# Patient Record
Sex: MALE | Race: WHITE | NOT HISPANIC OR LATINO
[De-identification: names, ages, dates, MRNs, and addresses within clinical notes are randomized per-mention and may not be internally consistent; named-entity substitution may affect disease eponyms.]

---

## 2018-06-21 ENCOUNTER — APPOINTMENT (OUTPATIENT)
Dept: GASTROENTEROLOGY | Facility: CLINIC | Age: 63
End: 2018-06-21

## 2021-04-07 DIAGNOSIS — M51.26 OTHER INTERVERTEBRAL DISC DISPLACEMENT, LUMBAR REGION: ICD-10-CM

## 2021-04-07 DIAGNOSIS — Z87.442 PERSONAL HISTORY OF URINARY CALCULI: ICD-10-CM

## 2021-04-07 DIAGNOSIS — Z86.39 PERSONAL HISTORY OF OTHER ENDOCRINE, NUTRITIONAL AND METABOLIC DISEASE: ICD-10-CM

## 2021-04-07 DIAGNOSIS — Z87.19 PERSONAL HISTORY OF OTHER DISEASES OF THE DIGESTIVE SYSTEM: ICD-10-CM

## 2021-04-07 DIAGNOSIS — Z82.49 FAMILY HISTORY OF ISCHEMIC HEART DISEASE AND OTHER DISEASES OF THE CIRCULATORY SYSTEM: ICD-10-CM

## 2021-04-07 DIAGNOSIS — Z80.7 FAMILY HISTORY OF OTHER MALIGNANT NEOPLASMS OF LYMPHOID, HEMATOPOIETIC AND RELATED TISSUES: ICD-10-CM

## 2021-04-07 DIAGNOSIS — N40.0 BENIGN PROSTATIC HYPERPLASIA WITHOUT LOWER URINARY TRACT SYMPMS: ICD-10-CM

## 2021-04-07 DIAGNOSIS — Z85.528 PERSONAL HISTORY OF OTHER MALIGNANT NEOPLASM OF KIDNEY: ICD-10-CM

## 2021-04-07 DIAGNOSIS — Z86.018 PERSONAL HISTORY OF OTHER BENIGN NEOPLASM: ICD-10-CM

## 2021-07-27 ENCOUNTER — APPOINTMENT (OUTPATIENT)
Dept: INTERNAL MEDICINE | Facility: CLINIC | Age: 66
End: 2021-07-27
Payer: COMMERCIAL

## 2021-07-27 VITALS — BODY MASS INDEX: 24.62 KG/M2 | WEIGHT: 172 LBS | HEIGHT: 70 IN

## 2021-07-27 VITALS — DIASTOLIC BLOOD PRESSURE: 74 MMHG | SYSTOLIC BLOOD PRESSURE: 136 MMHG

## 2021-07-27 PROCEDURE — 99396 PREV VISIT EST AGE 40-64: CPT | Mod: 25

## 2021-07-27 NOTE — REVIEW OF SYSTEMS
[Fever] : no fever [Chills] : no chills [Chest Pain] : no chest pain [Palpitations] : no palpitations [Lower Ext Edema] : no lower extremity edema [Shortness Of Breath] : no shortness of breath [Dyspnea on Exertion] : not dyspnea on exertion [Abdominal Pain] : no abdominal pain [Dysuria] : no dysuria [Hematuria] : no hematuria [Muscle Weakness] : no muscle weakness [Joint Swelling] : no joint swelling [Headache] : no headache [Dizziness] : no dizziness [Fainting] : no fainting [Negative] : Heme/Lymph

## 2021-07-27 NOTE — ASSESSMENT
[FreeTextEntry1] : His exam is unremarkable.\par \par Hypertension–his blood pressure is well controlled and he will remain on lisinopril 40 mg daily for now.\par \par Hyperlipidemia–follow-up fasting labs are sent his cholesterol has been elevated in the past but he has been reluctant to start medication but we will discuss it again when the results are available.\par \par History of renal cancer–he has been asymptomatic and he follows up regularly with his oncologist and surgeon.\par \par History of parathyroid adenoma/resection–he remains asymptomatic and follows with his endocrinologist.

## 2021-07-27 NOTE — HISTORY OF PRESENT ILLNESS
[de-identified] : 67 y/o presents for annual wellness and fasting labs.\par He has a history of hypertension, hyperlipidemia is well as renal cancer status post complete resection in 6/20.  He has regular follow-up with his surgeon as well as his oncologist and recently had a PET scan that was normal.  He has been generally well without any recent illness.

## 2021-07-27 NOTE — PHYSICAL EXAM
[No Acute Distress] : no acute distress [No JVD] : no jugular venous distention [No Lymphadenopathy] : no lymphadenopathy [Clear to Auscultation] : lungs were clear to auscultation bilaterally [Normal] : normal rate, regular rhythm, normal S1 and S2 and no murmur heard [No Carotid Bruits] : no carotid bruits [No Edema] : there was no peripheral edema [Non Tender] : non-tender [No Joint Swelling] : no joint swelling [No Focal Deficits] : no focal deficits [Normal Affect] : the affect was normal [Alert and Oriented x3] : oriented to person, place, and time

## 2021-07-27 NOTE — HEALTH RISK ASSESSMENT
[] : No [No] : In the past 12 months have you used drugs other than those required for medical reasons? No [No falls in past year] : Patient reported no falls in the past year [0] : 2) Feeling down, depressed, or hopeless: Not at all (0) [PHQ-2 Negative - No further assessment needed] : PHQ-2 Negative - No further assessment needed [[id number]] : 0 [Patient reported colonoscopy was normal] : Patient reported colonoscopy was normal [ColonoscopyDate] : 10/18

## 2021-07-28 LAB
ALBUMIN SERPL ELPH-MCNC: 4.7 G/DL
ALP BLD-CCNC: 54 U/L
ALT SERPL-CCNC: 24 U/L
ANION GAP SERPL CALC-SCNC: 13 MMOL/L
AST SERPL-CCNC: 23 U/L
BASOPHILS # BLD AUTO: 0.02 K/UL
BASOPHILS NFR BLD AUTO: 0.3 %
BILIRUB SERPL-MCNC: 0.3 MG/DL
BUN SERPL-MCNC: 35 MG/DL
CALCIUM SERPL-MCNC: 9.9 MG/DL
CHLORIDE SERPL-SCNC: 103 MMOL/L
CHOLEST SERPL-MCNC: 241 MG/DL
CO2 SERPL-SCNC: 24 MMOL/L
CREAT SERPL-MCNC: 1.43 MG/DL
EOSINOPHIL # BLD AUTO: 0.1 K/UL
EOSINOPHIL NFR BLD AUTO: 1.7 %
GLUCOSE SERPL-MCNC: 96 MG/DL
HCT VFR BLD CALC: 41.1 %
HDLC SERPL-MCNC: 60 MG/DL
HGB BLD-MCNC: 13.2 G/DL
IMM GRANULOCYTES NFR BLD AUTO: 0.3 %
LDLC SERPL CALC-MCNC: 166 MG/DL
LYMPHOCYTES # BLD AUTO: 1.39 K/UL
LYMPHOCYTES NFR BLD AUTO: 24.3 %
MAN DIFF?: NORMAL
MCHC RBC-ENTMCNC: 29.4 PG
MCHC RBC-ENTMCNC: 32.1 GM/DL
MCV RBC AUTO: 91.5 FL
MONOCYTES # BLD AUTO: 0.48 K/UL
MONOCYTES NFR BLD AUTO: 8.4 %
NEUTROPHILS # BLD AUTO: 3.71 K/UL
NEUTROPHILS NFR BLD AUTO: 65 %
NONHDLC SERPL-MCNC: 181 MG/DL
PLATELET # BLD AUTO: 313 K/UL
POTASSIUM SERPL-SCNC: 4.3 MMOL/L
PROT SERPL-MCNC: 6.7 G/DL
RBC # BLD: 4.49 M/UL
RBC # FLD: 13.3 %
SODIUM SERPL-SCNC: 140 MMOL/L
TRIGL SERPL-MCNC: 74 MG/DL
WBC # FLD AUTO: 5.72 K/UL

## 2022-08-08 ENCOUNTER — RX RENEWAL (OUTPATIENT)
Age: 67
End: 2022-08-08

## 2022-09-12 ENCOUNTER — APPOINTMENT (OUTPATIENT)
Dept: INTERNAL MEDICINE | Facility: CLINIC | Age: 67
End: 2022-09-12

## 2022-09-12 ENCOUNTER — NON-APPOINTMENT (OUTPATIENT)
Age: 67
End: 2022-09-12

## 2022-09-12 VITALS — WEIGHT: 156 LBS | HEIGHT: 70 IN | BODY MASS INDEX: 22.33 KG/M2

## 2022-09-12 VITALS — DIASTOLIC BLOOD PRESSURE: 82 MMHG | SYSTOLIC BLOOD PRESSURE: 134 MMHG

## 2022-09-12 DIAGNOSIS — R41.3 OTHER AMNESIA: ICD-10-CM

## 2022-09-12 PROCEDURE — G0402 INITIAL PREVENTIVE EXAM: CPT

## 2022-09-12 PROCEDURE — G0439: CPT

## 2022-09-12 PROCEDURE — 36415 COLL VENOUS BLD VENIPUNCTURE: CPT | Mod: 59

## 2022-09-12 NOTE — ASSESSMENT
[FreeTextEntry1] : His exam is unremarkable.\par Hypertension–his blood pressure remains well controlled on lisinopril 40 mg daily.\par \par Hyperlipidemia–follow-up lipid profile was sent.  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  His work-up was unremarkable and remains on aspirin 81 mg daily.\par \par History of renal cancer–he follows with his urologist/oncologist regularly.\par \par Last colonoscopy was in 10/18 and that was normal exam.

## 2022-09-12 NOTE — PHYSICAL EXAM
[No Acute Distress] : no acute distress [No JVD] : no jugular venous distention [No Lymphadenopathy] : no lymphadenopathy [Clear to Auscultation] : lungs were clear to auscultation bilaterally [Normal] : normal rate, regular rhythm, normal S1 and S2 and no murmur heard [No Carotid Bruits] : no carotid bruits [No Edema] : there was no peripheral edema [No Joint Swelling] : no joint swelling [Grossly Normal Strength/Tone] : grossly normal strength/tone [No Focal Deficits] : no focal deficits [Normal Affect] : the affect was normal [Alert and Oriented x3] : oriented to person, place, and time

## 2022-09-12 NOTE — HISTORY OF PRESENT ILLNESS
[de-identified] : 68 y/o male presents for annual Medicare wellness visit follow-up fasting labs and prescription renewals.\par He has history of hypertension, hyperlipidemia renal cancer status post complete resection in 2020 and hyperparathyroidism status post resection in 2018.\par Has been generally well without any recent illness.  He follows regularly with his urologist.\par Last colonoscopy was in 10/18 and that was a normal exam.

## 2022-09-12 NOTE — HEALTH RISK ASSESSMENT
[No falls in past year] : Patient reported no falls in the past year [0] : 2) Feeling down, depressed, or hopeless: Not at all (0) [PHQ-2 Negative - No further assessment needed] : PHQ-2 Negative - No further assessment needed [Never] : Never [No] : In the past 12 months have you used drugs other than those required for medical reasons? No [[id number]] : 0 [Patient reported colonoscopy was normal] : Patient reported colonoscopy was normal [ColonoscopyDate] : 10/18

## 2022-09-13 LAB
ALBUMIN SERPL ELPH-MCNC: 4.4 G/DL
ALP BLD-CCNC: 55 U/L
ALT SERPL-CCNC: 27 U/L
ANION GAP SERPL CALC-SCNC: 10 MMOL/L
AST SERPL-CCNC: 23 U/L
BASOPHILS # BLD AUTO: 0.02 K/UL
BASOPHILS NFR BLD AUTO: 0.4 %
BILIRUB SERPL-MCNC: 0.3 MG/DL
BUN SERPL-MCNC: 28 MG/DL
CALCIUM SERPL-MCNC: 9.6 MG/DL
CHLORIDE SERPL-SCNC: 101 MMOL/L
CHOLEST SERPL-MCNC: 212 MG/DL
CO2 SERPL-SCNC: 28 MMOL/L
CREAT SERPL-MCNC: 1.18 MG/DL
EGFR: 68 ML/MIN/1.73M2
EOSINOPHIL # BLD AUTO: 0.16 K/UL
EOSINOPHIL NFR BLD AUTO: 3.3 %
GLUCOSE SERPL-MCNC: 105 MG/DL
HCT VFR BLD CALC: 39.8 %
HDLC SERPL-MCNC: 57 MG/DL
HGB BLD-MCNC: 13 G/DL
IMM GRANULOCYTES NFR BLD AUTO: 0.2 %
LDLC SERPL CALC-MCNC: 139 MG/DL
LYMPHOCYTES # BLD AUTO: 1.36 K/UL
LYMPHOCYTES NFR BLD AUTO: 28.2 %
MAN DIFF?: NORMAL
MCHC RBC-ENTMCNC: 29.5 PG
MCHC RBC-ENTMCNC: 32.7 GM/DL
MCV RBC AUTO: 90.2 FL
MONOCYTES # BLD AUTO: 0.38 K/UL
MONOCYTES NFR BLD AUTO: 7.9 %
NEUTROPHILS # BLD AUTO: 2.89 K/UL
NEUTROPHILS NFR BLD AUTO: 60 %
NONHDLC SERPL-MCNC: 155 MG/DL
PLATELET # BLD AUTO: 292 K/UL
POTASSIUM SERPL-SCNC: 4.6 MMOL/L
PROT SERPL-MCNC: 6.4 G/DL
PSA SERPL-MCNC: 0.68 NG/ML
RBC # BLD: 4.41 M/UL
RBC # FLD: 13.1 %
SODIUM SERPL-SCNC: 140 MMOL/L
TRIGL SERPL-MCNC: 80 MG/DL
WBC # FLD AUTO: 4.82 K/UL

## 2023-09-26 ENCOUNTER — NON-APPOINTMENT (OUTPATIENT)
Age: 68
End: 2023-09-26

## 2023-09-26 ENCOUNTER — APPOINTMENT (OUTPATIENT)
Dept: INTERNAL MEDICINE | Facility: CLINIC | Age: 68
End: 2023-09-26
Payer: MEDICARE

## 2023-09-26 VITALS
WEIGHT: 164 LBS | DIASTOLIC BLOOD PRESSURE: 84 MMHG | SYSTOLIC BLOOD PRESSURE: 148 MMHG | HEIGHT: 70 IN | BODY MASS INDEX: 23.48 KG/M2

## 2023-09-26 DIAGNOSIS — Z00.00 ENCOUNTER FOR GENERAL ADULT MEDICAL EXAMINATION W/OUT ABNORMAL FINDINGS: ICD-10-CM

## 2023-09-26 DIAGNOSIS — D35.1 BENIGN NEOPLASM OF PARATHYROID GLAND: ICD-10-CM

## 2023-09-26 DIAGNOSIS — Z78.9 OTHER SPECIFIED HEALTH STATUS: ICD-10-CM

## 2023-09-26 DIAGNOSIS — E78.5 HYPERLIPIDEMIA, UNSPECIFIED: ICD-10-CM

## 2023-09-26 DIAGNOSIS — I10 ESSENTIAL (PRIMARY) HYPERTENSION: ICD-10-CM

## 2023-09-26 DIAGNOSIS — C64.2 MALIGNANT NEOPLASM OF LEFT KIDNEY, EXCEPT RENAL PELVIS: ICD-10-CM

## 2023-09-26 DIAGNOSIS — Z12.5 ENCOUNTER FOR SCREENING FOR MALIGNANT NEOPLASM OF PROSTATE: ICD-10-CM

## 2023-09-26 PROCEDURE — 36415 COLL VENOUS BLD VENIPUNCTURE: CPT

## 2023-09-26 PROCEDURE — G0439: CPT

## 2023-09-26 RX ORDER — LISINOPRIL 40 MG/1
40 TABLET ORAL
Qty: 90 | Refills: 3 | Status: ACTIVE | COMMUNITY
Start: 2021-04-07 | End: 1900-01-01

## 2023-09-27 LAB
ALBUMIN SERPL ELPH-MCNC: 4.5 G/DL
ALP BLD-CCNC: 54 U/L
ALT SERPL-CCNC: 26 U/L
ANION GAP SERPL CALC-SCNC: 13 MMOL/L
AST SERPL-CCNC: 22 U/L
BASOPHILS # BLD AUTO: 0.02 K/UL
BASOPHILS NFR BLD AUTO: 0.4 %
BILIRUB SERPL-MCNC: 0.2 MG/DL
BUN SERPL-MCNC: 24 MG/DL
CALCIUM SERPL-MCNC: 10 MG/DL
CHLORIDE SERPL-SCNC: 102 MMOL/L
CHOLEST SERPL-MCNC: 246 MG/DL
CO2 SERPL-SCNC: 26 MMOL/L
CREAT SERPL-MCNC: 1.12 MG/DL
EGFR: 72 ML/MIN/1.73M2
EOSINOPHIL # BLD AUTO: 0.16 K/UL
EOSINOPHIL NFR BLD AUTO: 3.2 %
GLUCOSE SERPL-MCNC: 98 MG/DL
HCT VFR BLD CALC: 40.4 %
HDLC SERPL-MCNC: 55 MG/DL
HGB BLD-MCNC: 13.1 G/DL
IMM GRANULOCYTES NFR BLD AUTO: 0.4 %
LDLC SERPL CALC-MCNC: 178 MG/DL
LYMPHOCYTES # BLD AUTO: 1.64 K/UL
LYMPHOCYTES NFR BLD AUTO: 32.5 %
MAN DIFF?: NORMAL
MCHC RBC-ENTMCNC: 29 PG
MCHC RBC-ENTMCNC: 32.4 GM/DL
MCV RBC AUTO: 89.6 FL
MONOCYTES # BLD AUTO: 0.51 K/UL
MONOCYTES NFR BLD AUTO: 10.1 %
NEUTROPHILS # BLD AUTO: 2.69 K/UL
NEUTROPHILS NFR BLD AUTO: 53.4 %
NONHDLC SERPL-MCNC: 191 MG/DL
PLATELET # BLD AUTO: 277 K/UL
POTASSIUM SERPL-SCNC: 4.5 MMOL/L
PROT SERPL-MCNC: 6.4 G/DL
PSA SERPL-MCNC: 0.79 NG/ML
RBC # BLD: 4.51 M/UL
RBC # FLD: 13.3 %
SODIUM SERPL-SCNC: 141 MMOL/L
T4 SERPL-MCNC: 7.3 UG/DL
TRIGL SERPL-MCNC: 75 MG/DL
TSH SERPL-ACNC: 3.01 UIU/ML
WBC # FLD AUTO: 5.04 K/UL

## 2024-09-19 ENCOUNTER — APPOINTMENT (OUTPATIENT)
Dept: INTERNAL MEDICINE | Facility: CLINIC | Age: 69
End: 2024-09-19
Payer: MEDICARE

## 2024-09-19 VITALS
WEIGHT: 154 LBS | SYSTOLIC BLOOD PRESSURE: 126 MMHG | DIASTOLIC BLOOD PRESSURE: 76 MMHG | BODY MASS INDEX: 29.07 KG/M2 | HEIGHT: 61 IN

## 2024-09-19 DIAGNOSIS — Z00.00 ENCOUNTER FOR GENERAL ADULT MEDICAL EXAMINATION W/OUT ABNORMAL FINDINGS: ICD-10-CM

## 2024-09-19 DIAGNOSIS — I10 ESSENTIAL (PRIMARY) HYPERTENSION: ICD-10-CM

## 2024-09-19 DIAGNOSIS — E78.5 HYPERLIPIDEMIA, UNSPECIFIED: ICD-10-CM

## 2024-09-19 DIAGNOSIS — Z12.5 ENCOUNTER FOR SCREENING FOR MALIGNANT NEOPLASM OF PROSTATE: ICD-10-CM

## 2024-09-19 PROCEDURE — 36415 COLL VENOUS BLD VENIPUNCTURE: CPT

## 2024-09-19 PROCEDURE — G0439: CPT

## 2024-09-19 NOTE — HISTORY OF PRESENT ILLNESS
[de-identified] : 70 y/o male presents for annual Medicare wellness visit follow-up fasting labs and prescription renewals. He has history of hypertension, hyperlipidemia renal cancer status post complete resection in 2020 and hyperparathyroidism status post resection in 2018. Recently had yearly follow-up with a urologist with no change. Has been generally well without any recent illness.   Last colonoscopy was in 10/18 and that was a normal exam.

## 2024-09-19 NOTE — REVIEW OF SYSTEMS
[Fever] : no fever [Chest Pain] : no chest pain [Palpitations] : no palpitations [Lower Ext Edema] : no lower extremity edema [Shortness Of Breath] : no shortness of breath [Dyspnea on Exertion] : not dyspnea on exertion [Abdominal Pain] : no abdominal pain [Muscle Weakness] : no muscle weakness [Headache] : no headache [Dizziness] : no dizziness [Fainting] : no fainting

## 2024-09-19 NOTE — ASSESSMENT
[FreeTextEntry1] : His exam is unremarkable.  Hypertension-his blood pressure remains well controlled. Continue lisinopril 40 mg daily.  [id number] lipid profile was sent.  He is presently not on medication.  Follow-up PSA was sent for prostate cancer screening.  He is asymptomatic.  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  His work-up was unremarkable and remains on aspirin 81 mg daily.  History of renal cancer-he follows with his urologist/oncologist regularly.  Last colonoscopy was in 10/18 and that was normal exam.

## 2024-09-19 NOTE — HEALTH RISK ASSESSMENT
[No] : In the past 12 months have you used drugs other than those required for medical reasons? No [No falls in past year] : Patient reported no falls in the past year [0] : 2) Feeling down, depressed, or hopeless: Not at all (0) [PHQ-2 Negative - No further assessment needed] : PHQ-2 Negative - No further assessment needed [Never] : Never [Patient reported colonoscopy was normal] : Patient reported colonoscopy was normal [[id number]] : 0 [ColonoscopyDate] : 10/18

## 2024-09-19 NOTE — PHYSICAL EXAM
[No Acute Distress] : no acute distress [No JVD] : no jugular venous distention [Clear to Auscultation] : lungs were clear to auscultation bilaterally [Regular Rhythm] : with a regular rhythm [Normal S1, S2] : normal S1 and S2 [No Murmur] : no murmur heard [No Edema] : there was no peripheral edema [Grossly Normal Strength/Tone] : grossly normal strength/tone [No Focal Deficits] : no focal deficits [Alert and Oriented x3] : oriented to person, place, and time [de-identified] : Benign

## 2024-09-20 ENCOUNTER — TRANSCRIPTION ENCOUNTER (OUTPATIENT)
Age: 69
End: 2024-09-20

## 2024-09-20 LAB
ALBUMIN SERPL ELPH-MCNC: 4.3 G/DL
ALP BLD-CCNC: 53 U/L
ALT SERPL-CCNC: 25 U/L
ANION GAP SERPL CALC-SCNC: 11 MMOL/L
AST SERPL-CCNC: 22 U/L
BASOPHILS # BLD AUTO: 0.02 K/UL
BASOPHILS NFR BLD AUTO: 0.4 %
BILIRUB SERPL-MCNC: 0.2 MG/DL
BUN SERPL-MCNC: 28 MG/DL
CALCIUM SERPL-MCNC: 9.3 MG/DL
CHLORIDE SERPL-SCNC: 105 MMOL/L
CHOLEST SERPL-MCNC: 243 MG/DL
CO2 SERPL-SCNC: 25 MMOL/L
CREAT SERPL-MCNC: 1.12 MG/DL
EGFR: 72 ML/MIN/1.73M2
EOSINOPHIL # BLD AUTO: 0.11 K/UL
EOSINOPHIL NFR BLD AUTO: 2.1 %
GLUCOSE SERPL-MCNC: 86 MG/DL
HCT VFR BLD CALC: 40.7 %
HDLC SERPL-MCNC: 55 MG/DL
HGB BLD-MCNC: 12.9 G/DL
IMM GRANULOCYTES NFR BLD AUTO: 0.7 %
LDLC SERPL CALC-MCNC: 169 MG/DL
LYMPHOCYTES # BLD AUTO: 1.37 K/UL
LYMPHOCYTES NFR BLD AUTO: 25.7 %
MAN DIFF?: NORMAL
MCHC RBC-ENTMCNC: 29.8 PG
MCHC RBC-ENTMCNC: 31.7 GM/DL
MCV RBC AUTO: 94 FL
MONOCYTES # BLD AUTO: 0.44 K/UL
MONOCYTES NFR BLD AUTO: 8.2 %
NEUTROPHILS # BLD AUTO: 3.36 K/UL
NEUTROPHILS NFR BLD AUTO: 62.9 %
NONHDLC SERPL-MCNC: 187 MG/DL
PLATELET # BLD AUTO: 282 K/UL
POTASSIUM SERPL-SCNC: 5 MMOL/L
PROT SERPL-MCNC: 6.3 G/DL
PSA SERPL-MCNC: 0.74 NG/ML
RBC # BLD: 4.33 M/UL
RBC # FLD: 13.7 %
SODIUM SERPL-SCNC: 141 MMOL/L
TRIGL SERPL-MCNC: 104 MG/DL
WBC # FLD AUTO: 5.34 K/UL

## 2024-12-17 ENCOUNTER — RX RENEWAL (OUTPATIENT)
Age: 69
End: 2024-12-17